# Patient Record
Sex: FEMALE | NOT HISPANIC OR LATINO
[De-identification: names, ages, dates, MRNs, and addresses within clinical notes are randomized per-mention and may not be internally consistent; named-entity substitution may affect disease eponyms.]

---

## 2017-02-22 ENCOUNTER — RX RENEWAL (OUTPATIENT)
Age: 82
End: 2017-02-22

## 2017-07-12 ENCOUNTER — RESULT REVIEW (OUTPATIENT)
Age: 82
End: 2017-07-12

## 2017-08-22 ENCOUNTER — APPOINTMENT (OUTPATIENT)
Dept: HEMATOLOGY ONCOLOGY | Facility: CLINIC | Age: 82
End: 2017-08-22
Payer: MEDICARE

## 2017-08-22 VITALS
DIASTOLIC BLOOD PRESSURE: 70 MMHG | BODY MASS INDEX: 32.26 KG/M2 | TEMPERATURE: 98.9 F | SYSTOLIC BLOOD PRESSURE: 122 MMHG | WEIGHT: 196 LBS | HEIGHT: 65.5 IN | HEART RATE: 87 BPM

## 2017-08-22 PROCEDURE — 99214 OFFICE O/P EST MOD 30 MIN: CPT

## 2017-12-22 ENCOUNTER — LABORATORY RESULT (OUTPATIENT)
Age: 82
End: 2017-12-22

## 2017-12-22 ENCOUNTER — APPOINTMENT (OUTPATIENT)
Dept: HEMATOLOGY ONCOLOGY | Facility: CLINIC | Age: 82
End: 2017-12-22
Payer: MEDICARE

## 2017-12-22 VITALS
HEART RATE: 85 BPM | SYSTOLIC BLOOD PRESSURE: 118 MMHG | WEIGHT: 192 LBS | DIASTOLIC BLOOD PRESSURE: 69 MMHG | TEMPERATURE: 98.5 F | BODY MASS INDEX: 31.6 KG/M2 | HEIGHT: 65.5 IN

## 2017-12-22 LAB
HCT VFR BLD CALC: 41.1 %
HGB BLD-MCNC: 13.8 G/DL
MCHC RBC-ENTMCNC: 33.6 GM/DL
MCHC RBC-ENTMCNC: 33.6 PG
MCV RBC AUTO: 100 FL
PLATELET # BLD AUTO: 199 K/UL
RBC # BLD: 4.1 M/UL
RBC # FLD: 11.7 %
WBC # FLD AUTO: 8 K/UL

## 2017-12-22 PROCEDURE — 36415 COLL VENOUS BLD VENIPUNCTURE: CPT

## 2017-12-22 PROCEDURE — 85025 COMPLETE CBC W/AUTO DIFF WBC: CPT

## 2017-12-22 PROCEDURE — 99215 OFFICE O/P EST HI 40 MIN: CPT | Mod: 25

## 2017-12-22 RX ORDER — PEN NEEDLE, DIABETIC 30 GX 1/3"
32G X 5 MM NEEDLE, DISPOSABLE MISCELLANEOUS
Qty: 100 | Refills: 0 | Status: ACTIVE | COMMUNITY
Start: 2017-07-05

## 2018-01-03 LAB
ERYTHROCYTE [SEDIMENTATION RATE] IN BLOOD BY WESTERGREN METHOD: 21 MM/HR
FERRITIN SERPL-MCNC: 509 NG/ML
HFE GENE MUT ANL BLD/T: NORMAL
IRON SATN MFR SERPL: 55 %
IRON SERPL-MCNC: 173 UG/DL
TIBC SERPL-MCNC: 312 UG/DL
UIBC SERPL-MCNC: 139 UG/DL

## 2018-02-23 ENCOUNTER — APPOINTMENT (OUTPATIENT)
Dept: HEMATOLOGY ONCOLOGY | Facility: CLINIC | Age: 83
End: 2018-02-23
Payer: MEDICARE

## 2018-02-23 VITALS
TEMPERATURE: 98 F | HEIGHT: 65.5 IN | DIASTOLIC BLOOD PRESSURE: 78 MMHG | BODY MASS INDEX: 31.27 KG/M2 | HEART RATE: 79 BPM | WEIGHT: 190 LBS | SYSTOLIC BLOOD PRESSURE: 146 MMHG

## 2018-02-23 PROCEDURE — 99214 OFFICE O/P EST MOD 30 MIN: CPT

## 2018-02-23 RX ORDER — METOPROLOL TARTRATE 25 MG/1
25 TABLET, FILM COATED ORAL DAILY
Refills: 0 | Status: ACTIVE | COMMUNITY
Start: 2018-02-23

## 2018-04-06 ENCOUNTER — LABORATORY RESULT (OUTPATIENT)
Age: 83
End: 2018-04-06

## 2018-04-06 ENCOUNTER — APPOINTMENT (OUTPATIENT)
Dept: INFUSION THERAPY | Facility: CLINIC | Age: 83
End: 2018-04-06
Payer: MEDICARE

## 2018-04-06 VITALS
HEART RATE: 76 BPM | TEMPERATURE: 98.4 F | DIASTOLIC BLOOD PRESSURE: 72 MMHG | BODY MASS INDEX: 31.44 KG/M2 | WEIGHT: 191 LBS | HEIGHT: 65.5 IN | SYSTOLIC BLOOD PRESSURE: 145 MMHG

## 2018-04-06 LAB
HCT VFR BLD CALC: 42 %
HGB BLD-MCNC: 13.6 G/DL
MCHC RBC-ENTMCNC: 32.3 PG
MCHC RBC-ENTMCNC: 32.4 GM/DL
MCV RBC AUTO: 99.7 FL
PLATELET # BLD AUTO: 217 K/UL
RBC # BLD: 4.21 M/UL
RBC # FLD: 11.8 %
WBC # FLD AUTO: 8 K/UL

## 2018-04-06 PROCEDURE — 99195 PHLEBOTOMY: CPT | Mod: 59

## 2018-04-06 PROCEDURE — 36415 COLL VENOUS BLD VENIPUNCTURE: CPT

## 2018-04-06 PROCEDURE — 85025 COMPLETE CBC W/AUTO DIFF WBC: CPT

## 2018-04-07 LAB
ERYTHROCYTE [SEDIMENTATION RATE] IN BLOOD BY WESTERGREN METHOD: 27 MM/HR
FERRITIN SERPL-MCNC: 407 NG/ML
IRON SATN MFR SERPL: 55 %
IRON SERPL-MCNC: 163 UG/DL
TIBC SERPL-MCNC: 298 UG/DL
UIBC SERPL-MCNC: 135 UG/DL

## 2018-05-18 ENCOUNTER — LABORATORY RESULT (OUTPATIENT)
Age: 83
End: 2018-05-18

## 2018-05-18 ENCOUNTER — APPOINTMENT (OUTPATIENT)
Dept: INFUSION THERAPY | Facility: CLINIC | Age: 83
End: 2018-05-18
Payer: MEDICARE

## 2018-05-18 VITALS
BODY MASS INDEX: 31.38 KG/M2 | DIASTOLIC BLOOD PRESSURE: 78 MMHG | HEART RATE: 76 BPM | WEIGHT: 191.5 LBS | TEMPERATURE: 98.2 F | SYSTOLIC BLOOD PRESSURE: 146 MMHG

## 2018-05-18 VITALS — HEART RATE: 80 BPM | SYSTOLIC BLOOD PRESSURE: 140 MMHG | DIASTOLIC BLOOD PRESSURE: 83 MMHG

## 2018-05-18 PROCEDURE — 99195 PHLEBOTOMY: CPT | Mod: 59

## 2018-05-18 PROCEDURE — 36415 COLL VENOUS BLD VENIPUNCTURE: CPT

## 2018-05-18 PROCEDURE — 85025 COMPLETE CBC W/AUTO DIFF WBC: CPT

## 2018-05-21 LAB
HCT VFR BLD CALC: 40.6 %
HGB BLD-MCNC: 13.5 G/DL
MCHC RBC-ENTMCNC: 32.9 PG
MCHC RBC-ENTMCNC: 33.2 GM/DL
MCV RBC AUTO: 99.1 FL
PLATELET # BLD AUTO: 213 K/UL
RBC # BLD: 4.1 M/UL
RBC # FLD: 12.2 %
WBC # FLD AUTO: 8.7 K/UL

## 2018-06-29 ENCOUNTER — LABORATORY RESULT (OUTPATIENT)
Age: 83
End: 2018-06-29

## 2018-06-29 ENCOUNTER — APPOINTMENT (OUTPATIENT)
Dept: INFUSION THERAPY | Facility: CLINIC | Age: 83
End: 2018-06-29
Payer: MEDICARE

## 2018-06-29 VITALS
DIASTOLIC BLOOD PRESSURE: 68 MMHG | TEMPERATURE: 98.5 F | HEART RATE: 76 BPM | BODY MASS INDEX: 31.77 KG/M2 | WEIGHT: 193 LBS | SYSTOLIC BLOOD PRESSURE: 116 MMHG | HEIGHT: 65.5 IN

## 2018-06-29 LAB
HCT VFR BLD CALC: 40.9 %
HGB BLD-MCNC: 13.7 G/DL
MCHC RBC-ENTMCNC: 33.2 PG
MCHC RBC-ENTMCNC: 33.6 GM/DL
MCV RBC AUTO: 98.8 FL
PLATELET # BLD AUTO: 202 K/UL
RBC # BLD: 4.14 M/UL
RBC # FLD: 11.4 %
WBC # FLD AUTO: 6.5 K/UL

## 2018-06-29 PROCEDURE — 85025 COMPLETE CBC W/AUTO DIFF WBC: CPT

## 2018-06-29 PROCEDURE — 36415 COLL VENOUS BLD VENIPUNCTURE: CPT

## 2018-06-29 PROCEDURE — 99195 PHLEBOTOMY: CPT | Mod: 59

## 2018-08-02 ENCOUNTER — OTHER (OUTPATIENT)
Age: 83
End: 2018-08-02

## 2018-08-10 ENCOUNTER — LABORATORY RESULT (OUTPATIENT)
Age: 83
End: 2018-08-10

## 2018-08-10 ENCOUNTER — APPOINTMENT (OUTPATIENT)
Dept: INFUSION THERAPY | Facility: CLINIC | Age: 83
End: 2018-08-10
Payer: MEDICARE

## 2018-08-10 VITALS
BODY MASS INDEX: 31.77 KG/M2 | SYSTOLIC BLOOD PRESSURE: 146 MMHG | HEIGHT: 65.5 IN | HEART RATE: 74 BPM | DIASTOLIC BLOOD PRESSURE: 67 MMHG | WEIGHT: 193 LBS | TEMPERATURE: 98.6 F

## 2018-08-10 LAB
HCT VFR BLD CALC: 42.1 %
HGB BLD-MCNC: 14.3 G/DL
MCHC RBC-ENTMCNC: 33.2 PG
MCHC RBC-ENTMCNC: 33.9 GM/DL
MCV RBC AUTO: 97.8 FL
PLATELET # BLD AUTO: 186 K/UL
RBC # BLD: 4.3 M/UL
RBC # FLD: 11 %
WBC # FLD AUTO: 7.1 K/UL

## 2018-08-10 PROCEDURE — 99195 PHLEBOTOMY: CPT | Mod: 59

## 2018-08-10 PROCEDURE — 85025 COMPLETE CBC W/AUTO DIFF WBC: CPT

## 2018-08-10 PROCEDURE — 36415 COLL VENOUS BLD VENIPUNCTURE: CPT

## 2018-08-14 LAB
ERYTHROCYTE [SEDIMENTATION RATE] IN BLOOD BY WESTERGREN METHOD: 16 MM/HR
FERRITIN SERPL-MCNC: 251 NG/ML
IRON SATN MFR SERPL: 33 %
IRON SERPL-MCNC: 111 UG/DL
TIBC SERPL-MCNC: 334 UG/DL
UIBC SERPL-MCNC: 223 UG/DL

## 2018-09-21 ENCOUNTER — APPOINTMENT (OUTPATIENT)
Dept: INFUSION THERAPY | Facility: CLINIC | Age: 83
End: 2018-09-21
Payer: MEDICARE

## 2018-09-21 ENCOUNTER — LABORATORY RESULT (OUTPATIENT)
Age: 83
End: 2018-09-21

## 2018-09-21 VITALS
TEMPERATURE: 98.5 F | HEIGHT: 65.5 IN | DIASTOLIC BLOOD PRESSURE: 76 MMHG | SYSTOLIC BLOOD PRESSURE: 139 MMHG | HEART RATE: 78 BPM

## 2018-09-21 LAB
HCT VFR BLD CALC: 40.2 %
HGB BLD-MCNC: 13.8 G/DL
MCHC RBC-ENTMCNC: 32.8 PG
MCHC RBC-ENTMCNC: 34.4 GM/DL
MCV RBC AUTO: 95.5 FL
PLATELET # BLD AUTO: 183 K/UL
RBC # BLD: 4.21 M/UL
RBC # FLD: 11.4 %
WBC # FLD AUTO: 7 K/UL

## 2018-09-21 PROCEDURE — 36415 COLL VENOUS BLD VENIPUNCTURE: CPT

## 2018-09-21 PROCEDURE — 99195 PHLEBOTOMY: CPT | Mod: 59

## 2018-09-21 PROCEDURE — 85025 COMPLETE CBC W/AUTO DIFF WBC: CPT

## 2018-11-02 ENCOUNTER — APPOINTMENT (OUTPATIENT)
Dept: INFUSION THERAPY | Facility: CLINIC | Age: 83
End: 2018-11-02

## 2018-11-16 ENCOUNTER — LABORATORY RESULT (OUTPATIENT)
Age: 83
End: 2018-11-16

## 2018-11-16 ENCOUNTER — APPOINTMENT (OUTPATIENT)
Dept: HEMATOLOGY ONCOLOGY | Facility: CLINIC | Age: 83
End: 2018-11-16
Payer: MEDICARE

## 2018-11-16 VITALS
SYSTOLIC BLOOD PRESSURE: 124 MMHG | HEIGHT: 65.5 IN | BODY MASS INDEX: 31.77 KG/M2 | HEART RATE: 78 BPM | TEMPERATURE: 98.3 F | DIASTOLIC BLOOD PRESSURE: 75 MMHG | WEIGHT: 193 LBS

## 2018-11-16 LAB
HCT VFR BLD CALC: 42.1 %
HGB BLD-MCNC: 13.5 G/DL
MCHC RBC-ENTMCNC: 32.1 GM/DL
MCHC RBC-ENTMCNC: 32.4 PG
MCV RBC AUTO: 101 FL
PLATELET # BLD AUTO: 201 K/UL
RBC # BLD: 4.17 M/UL
RBC # FLD: 12 %
WBC # FLD AUTO: 7.3 K/UL

## 2018-11-16 PROCEDURE — 36415 COLL VENOUS BLD VENIPUNCTURE: CPT

## 2018-11-16 PROCEDURE — 85025 COMPLETE CBC W/AUTO DIFF WBC: CPT

## 2018-11-17 LAB
IRON SATN MFR SERPL: 38 %
IRON SERPL-MCNC: 130 UG/DL
TIBC SERPL-MCNC: 338 UG/DL
UIBC SERPL-MCNC: 208 UG/DL

## 2018-11-20 LAB — FERRITIN SERPL-MCNC: 192 NG/ML

## 2018-11-30 ENCOUNTER — APPOINTMENT (OUTPATIENT)
Dept: INFUSION THERAPY | Facility: CLINIC | Age: 83
End: 2018-11-30
Payer: MEDICARE

## 2018-11-30 ENCOUNTER — APPOINTMENT (OUTPATIENT)
Dept: HEMATOLOGY ONCOLOGY | Facility: CLINIC | Age: 83
End: 2018-11-30
Payer: MEDICARE

## 2018-11-30 ENCOUNTER — LABORATORY RESULT (OUTPATIENT)
Age: 83
End: 2018-11-30

## 2018-11-30 VITALS
TEMPERATURE: 98 F | DIASTOLIC BLOOD PRESSURE: 69 MMHG | HEART RATE: 73 BPM | HEIGHT: 65.5 IN | WEIGHT: 196.13 LBS | SYSTOLIC BLOOD PRESSURE: 139 MMHG | BODY MASS INDEX: 32.28 KG/M2

## 2018-11-30 PROCEDURE — 99195 PHLEBOTOMY: CPT | Mod: 59

## 2018-11-30 PROCEDURE — 99214 OFFICE O/P EST MOD 30 MIN: CPT | Mod: 25

## 2018-11-30 PROCEDURE — 85025 COMPLETE CBC W/AUTO DIFF WBC: CPT

## 2018-11-30 PROCEDURE — 36415 COLL VENOUS BLD VENIPUNCTURE: CPT

## 2018-11-30 NOTE — ASSESSMENT
[FreeTextEntry1] : 82 y/o woman with history of stage IA left breast cancer diagnosed in 2007, s/p lumpectomy, adjuvant radiation and 10 years of adjuvant Arimidex. SHERIDAN. Yearly exam.  Mammogram yearly- scheduled. 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 \par \par In view of her age- ferritin goal will be less" aggressive"- < 100 mg%.\par \par Phlebotomy today and one more later this month.\par Return visit in March for phlebotomy/ labs will be done at that time.\par Testing of family members was discussed previously. \par \par \par

## 2018-11-30 NOTE — REASON FOR VISIT
[Follow-Up Visit] : a follow-up [FreeTextEntry2] : Hereditary hemochromatosis  and history of breast cancer

## 2018-12-03 LAB
HCT VFR BLD CALC: 44.1 %
HGB BLD-MCNC: 14 G/DL
MCHC RBC-ENTMCNC: 31.7 GM/DL
MCHC RBC-ENTMCNC: 31.7 PG
MCV RBC AUTO: 99.7 FL
PLATELET # BLD AUTO: 202 K/UL
RBC # BLD: 4.42 M/UL
RBC # FLD: 12 %
WBC # FLD AUTO: 6 K/UL

## 2018-12-18 ENCOUNTER — APPOINTMENT (OUTPATIENT)
Dept: INFUSION THERAPY | Facility: CLINIC | Age: 83
End: 2018-12-18
Payer: MEDICARE

## 2018-12-18 ENCOUNTER — LABORATORY RESULT (OUTPATIENT)
Age: 83
End: 2018-12-18

## 2018-12-18 VITALS
HEART RATE: 76 BPM | SYSTOLIC BLOOD PRESSURE: 161 MMHG | BODY MASS INDEX: 32.8 KG/M2 | HEIGHT: 65.5 IN | DIASTOLIC BLOOD PRESSURE: 75 MMHG | TEMPERATURE: 98.3 F | WEIGHT: 199.25 LBS

## 2018-12-18 LAB
HCT VFR BLD CALC: 41.7 %
HGB BLD-MCNC: 13.5 G/DL
MCHC RBC-ENTMCNC: 32.3 GM/DL
MCHC RBC-ENTMCNC: 32.5 PG
MCV RBC AUTO: 101 FL
PLATELET # BLD AUTO: 218 K/UL
RBC # BLD: 4.15 M/UL
RBC # FLD: 12.5 %
WBC # FLD AUTO: 8.3 K/UL

## 2018-12-18 PROCEDURE — 99195 PHLEBOTOMY: CPT | Mod: 59

## 2018-12-18 PROCEDURE — 85025 COMPLETE CBC W/AUTO DIFF WBC: CPT

## 2018-12-18 PROCEDURE — 36415 COLL VENOUS BLD VENIPUNCTURE: CPT

## 2019-03-04 ENCOUNTER — TRANSCRIPTION ENCOUNTER (OUTPATIENT)
Age: 84
End: 2019-03-04

## 2019-03-12 ENCOUNTER — APPOINTMENT (OUTPATIENT)
Dept: INFUSION THERAPY | Facility: CLINIC | Age: 84
End: 2019-03-12

## 2019-03-12 ENCOUNTER — LABORATORY RESULT (OUTPATIENT)
Age: 84
End: 2019-03-12

## 2019-03-12 ENCOUNTER — APPOINTMENT (OUTPATIENT)
Dept: HEMATOLOGY ONCOLOGY | Facility: CLINIC | Age: 84
End: 2019-03-12
Payer: MEDICARE

## 2019-03-12 VITALS
WEIGHT: 194.13 LBS | BODY MASS INDEX: 31.96 KG/M2 | HEART RATE: 73 BPM | SYSTOLIC BLOOD PRESSURE: 143 MMHG | DIASTOLIC BLOOD PRESSURE: 79 MMHG | HEIGHT: 65.5 IN | TEMPERATURE: 98.3 F

## 2019-03-12 DIAGNOSIS — Z79.899 ENCOUNTER FOR THERAPEUTIC DRUG LVL MONITORING: ICD-10-CM

## 2019-03-12 DIAGNOSIS — E11.9 TYPE 2 DIABETES MELLITUS W/OUT COMPLICATIONS: ICD-10-CM

## 2019-03-12 DIAGNOSIS — Z51.81 ENCOUNTER FOR THERAPEUTIC DRUG LVL MONITORING: ICD-10-CM

## 2019-03-12 DIAGNOSIS — Z85.3 ENCOUNTER FOR FOLLOW-UP EXAMINATION AFTER COMPLETED TREATMENT FOR MALIGNANT NEOPLASM: ICD-10-CM

## 2019-03-12 DIAGNOSIS — Z08 ENCOUNTER FOR FOLLOW-UP EXAMINATION AFTER COMPLETED TREATMENT FOR MALIGNANT NEOPLASM: ICD-10-CM

## 2019-03-12 LAB
HCT VFR BLD CALC: 45 %
HGB BLD-MCNC: 15 G/DL
MCHC RBC-ENTMCNC: 32.2 PG
MCHC RBC-ENTMCNC: 33.4 GM/DL
MCV RBC AUTO: 96.4 FL
PLATELET # BLD AUTO: 190 K/UL
RBC # BLD: 4.67 M/UL
RBC # FLD: 11.4 %
WBC # FLD AUTO: 5.9 K/UL

## 2019-03-12 PROCEDURE — 36415 COLL VENOUS BLD VENIPUNCTURE: CPT

## 2019-03-12 PROCEDURE — 99213 OFFICE O/P EST LOW 20 MIN: CPT | Mod: 25

## 2019-03-12 PROCEDURE — 99195 PHLEBOTOMY: CPT | Mod: 59

## 2019-03-12 PROCEDURE — 85025 COMPLETE CBC W/AUTO DIFF WBC: CPT

## 2019-03-12 NOTE — RESULTS/DATA
[FreeTextEntry1] : WBC: 5.9   Hgb: 15.00  Hct: 45.0  MCV: 96.4  Plts: 190\par Ferritin, TSAT: pending\par \par Labs done 2/20/19 (Dr. Bills's office)\par Ferritin was 109, TSAT 41%.  \par \par Labs done 11/16/18:\par Ferritin was 192, TSAT 38%.  - had phleb x 2 after\par

## 2019-03-12 NOTE — HISTORY OF PRESENT ILLNESS
[de-identified] : RICHI WHITE is an 83 y.o. who we are following for a hx of a left sided ER+, HER2- stage I BC and hemochromatosis. \par Hx of a stage IA left BC in 2007 - s/p left lumpectomy on 11/14/07 for a 1.4cm ductal carcinoma with lobular features, 1 SLN was neg, T1cN0, ER>80%, AK> 80%, HER2 neg by FISH.  Had XRT, followed by 10 years of hormonal therapy completed on 2/2018 (initially with Femara, then changed to Arimidex due to cost issues). \par \par 10/2017 - Routine labs by PCP showed a Hgb of 14.3 with a Ferritin of 537 and a TSAT of 53%.  She was taking a MVI with iron and stopped this.  Genetic testing showed double heterozygous mutation for C282Y and H63D in the HFE gene.  Started on phlebotomies, initially every 2 months. \par 2/20/19 - Ferritin was 109, TSAT 41%.   [de-identified] : The patient had labs done with Dr. Bills on 2/20/19 - Ferritin was 109, TSAT 41%.  Phlebotomized today.  \par Feeling well.  Has no complaints.  She denies any changes in her family, medical, or social history since her last visit of 11/30/18.

## 2019-03-12 NOTE — PHYSICAL EXAM
[Obese] : obese [Normal] : affect appropriate [de-identified] : elderly female, looks good for age [de-identified] : anicteric [de-identified] : no thrush or mucositis [de-identified] : no lymphadenopathy [de-identified] : S1S2 RRR, no obvious murmurs [de-identified] : trace- 1+ LE edema, L>R [de-identified] : patient declined [de-identified] : no rashes

## 2019-03-12 NOTE — ASSESSMENT
[FreeTextEntry1] : The patient is an 83 y.o. with a PMH significant for an ER+ left sided stage I BC, s/p lumpectomy, XRT, and AI x 10 years, last 2/2018, now being treated for hemochromatosis.  \par Last Ferritin was 109.  Review of last shows that she seems to decrease ~ 45 - 50 points with each phlebotomy. Based on this I would expect her Ferritin would now be down to ~ 60. \par This is the 1st time we have gotten her low since her diagnosis - I do not know how long it will take her to reaccumulate.  \par Will have her return in 4 months for iron studies.  Will schedule her for a phleb once Ferritin >~ 120.  \par Continue routine follow-up: \par Getting yearly mammograms - Was due 11/2018 - still needs to get.  Has had to cancel some appts due to weather and work. \par Bone density - Due 11/2019. Last with moderate- severe osteopenia.  \par Doesn't go to GYN anymore, also no more colonoscopies.  \par Return in 4 months for labs. \par \par Dr. Geovany Bills (PMD)\par Dr. Michael Murray (Endo)\par Dr. Torsten Mendoza (Cardio)\par Dr. Umanzor (GI)\par

## 2019-03-12 NOTE — REVIEW OF SYSTEMS
[Patient Intake Form Reviewed] : Patient intake form was reviewed [Loss of Hearing] : loss of hearing [Negative] : Allergic/Immunologic [FreeTextEntry8] : frequency

## 2019-03-13 LAB
FERRITIN SERPL-MCNC: 136 NG/ML
IRON SATN MFR SERPL: 32 %
IRON SERPL-MCNC: 121 UG/DL
TIBC SERPL-MCNC: 373 UG/DL
UIBC SERPL-MCNC: 252 UG/DL

## 2019-07-16 ENCOUNTER — APPOINTMENT (OUTPATIENT)
Dept: HEMATOLOGY ONCOLOGY | Facility: CLINIC | Age: 84
End: 2019-07-16
Payer: MEDICARE

## 2019-07-16 ENCOUNTER — LABORATORY RESULT (OUTPATIENT)
Age: 84
End: 2019-07-16

## 2019-07-16 VITALS — TEMPERATURE: 98.5 F | HEART RATE: 71 BPM | DIASTOLIC BLOOD PRESSURE: 78 MMHG | SYSTOLIC BLOOD PRESSURE: 143 MMHG

## 2019-07-16 LAB
FERRITIN SERPL-MCNC: 112 NG/ML
HCT VFR BLD CALC: 43.4 %
HGB BLD-MCNC: 14.3 G/DL
IRON SATN MFR SERPL: 43 %
IRON SERPL-MCNC: 147 UG/DL
MCHC RBC-ENTMCNC: 32.4 PG
MCHC RBC-ENTMCNC: 32.9 GM/DL
MCV RBC AUTO: 98.5 FL
PLATELET # BLD AUTO: 197 K/UL
RBC # BLD: 4.41 M/UL
RBC # FLD: 11.9 %
TIBC SERPL-MCNC: 343 UG/DL
UIBC SERPL-MCNC: 196 UG/DL
WBC # FLD AUTO: 5.9 K/UL

## 2019-07-16 PROCEDURE — 85025 COMPLETE CBC W/AUTO DIFF WBC: CPT

## 2019-07-16 PROCEDURE — 36415 COLL VENOUS BLD VENIPUNCTURE: CPT

## 2020-10-23 ENCOUNTER — OUTPATIENT (OUTPATIENT)
Dept: OUTPATIENT SERVICES | Facility: HOSPITAL | Age: 85
LOS: 1 days | Discharge: HOME | End: 2020-10-23

## 2020-10-23 DIAGNOSIS — E83.110 HEREDITARY HEMOCHROMATOSIS: ICD-10-CM

## 2020-10-23 DIAGNOSIS — C50.912 MALIGNANT NEOPLASM OF UNSPECIFIED SITE OF LEFT FEMALE BREAST: ICD-10-CM

## 2020-10-26 ENCOUNTER — APPOINTMENT (OUTPATIENT)
Dept: HEMATOLOGY ONCOLOGY | Facility: CLINIC | Age: 85
End: 2020-10-26
Payer: MEDICARE

## 2020-10-26 VITALS
RESPIRATION RATE: 16 BRPM | DIASTOLIC BLOOD PRESSURE: 76 MMHG | TEMPERATURE: 97.9 F | SYSTOLIC BLOOD PRESSURE: 131 MMHG | WEIGHT: 195 LBS | HEIGHT: 65.5 IN | BODY MASS INDEX: 32.1 KG/M2 | HEART RATE: 75 BPM

## 2020-10-26 DIAGNOSIS — C50.912 MALIGNANT NEOPLASM OF UNSPECIFIED SITE OF LEFT FEMALE BREAST: ICD-10-CM

## 2020-10-26 PROCEDURE — 99214 OFFICE O/P EST MOD 30 MIN: CPT

## 2020-10-26 RX ORDER — LISINOPRIL 5 MG/1
5 TABLET ORAL DAILY
Qty: 90 | Refills: 1 | Status: DISCONTINUED | COMMUNITY
Start: 2018-11-30 | End: 2020-10-26

## 2020-10-26 RX ORDER — AMLODIPINE BESYLATE 5 MG/1
5 TABLET ORAL
Qty: 90 | Refills: 0 | Status: ACTIVE | COMMUNITY
Start: 2020-03-25

## 2020-10-26 RX ORDER — LISINOPRIL 10 MG/1
10 TABLET ORAL
Qty: 90 | Refills: 0 | Status: ACTIVE | COMMUNITY
Start: 2020-10-09

## 2020-10-26 RX ORDER — VITAMIN K2 90 MCG
125 MCG CAPSULE ORAL
Qty: 90 | Refills: 0 | Status: ACTIVE | COMMUNITY
Start: 2020-07-10

## 2020-10-26 RX ORDER — PANTOPRAZOLE 40 MG/1
40 TABLET, DELAYED RELEASE ORAL
Qty: 30 | Refills: 0 | Status: DISCONTINUED | COMMUNITY
Start: 2017-08-31 | End: 2020-10-26

## 2020-10-26 NOTE — RESULTS/DATA
[FreeTextEntry1] : reviewed recent labs from PCP ( done Oct 2020) \par \par ferritin 193  sat 53 %  Hgb normal

## 2020-10-26 NOTE — ASSESSMENT
[FreeTextEntry1] : 84 y/o woman with history of stage IA left breast cancer diagnosed in 2007, s/p lumpectomy, adjuvant radiation and 10 years of adjuvant Arimidex. SHERIDAN. \par \par Continue yearly exam and yearly mammogram. 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 \par \par In view of her age- ferritin goal will be less" aggressive"- < 100 mg%.\par \par Plan : phlebotomy x 2 - 3 months apart.\par \par Blood work : CBC, iron studies in 6 months.\par \par D/w patient and her daughter.

## 2020-10-26 NOTE — HISTORY OF PRESENT ILLNESS
[Disease: _____________________] : Disease: [unfilled] [T: ___] : T[unfilled] [N: ___] : N[unfilled] [M: ___] : M[unfilled] [AJCC Stage: ____] : AJCC Stage: [unfilled] [de-identified] : Presented in 2007 with an abnormal mammogram. On 11/14/2007 she underwent left breast lumpectomy and sentinel lymph node biopsy for stage I A ductal/ lobular cancer , strongly ER/LA positive. Received adjuvant radiation and started adjuvant aromatase inhibitor in February 2008 ( Femara, changed to Arimidex due to cost issues).She opted for extended adjuvant hormonal therapy. Completed 10 years in February 2018.\par \par Routine blood work by PCP in  October 2017 showed normal Hgb 14.3 with ferritin 537 and iron sat 53 %). she stopped multivitamin with iron. No history of hemochromatosis in the family. Genetic testing showed double heterozygous mutation for C282Y and H63D.\par She has been getting phlebotomies  here. She was not interested in going to Blood center for phlebotomies. \par \par Last phlebotomy over one year ago.  [de-identified] : ductal with focal lobular features  [de-identified] : ER > 80 %  OK > 80 %  HER 2 negative  by FISH [de-identified] : Feels well. Has no complaints. Last phlebotomy > one year ago.\par mammogram- within one year _ [facility] ( told OK, gets Rx from PCP).

## 2020-10-26 NOTE — PHYSICAL EXAM
[Normal] : well developed, well nourished, in no acute distress [Fully active, able to carry on all pre-disease performance without restriction] : Status 0 - Fully active, able to carry on all pre-disease performance without restriction [de-identified] : Faint lumpectomy scar L berast and axillary scar. no palpable masses b/l No axillary adenopathy b/l

## 2020-11-04 ENCOUNTER — RESULT REVIEW (OUTPATIENT)
Age: 85
End: 2020-11-04

## 2020-11-04 ENCOUNTER — APPOINTMENT (OUTPATIENT)
Dept: INFUSION THERAPY | Facility: CLINIC | Age: 85
End: 2020-11-04

## 2020-11-04 VITALS
HEIGHT: 65.5 IN | SYSTOLIC BLOOD PRESSURE: 122 MMHG | DIASTOLIC BLOOD PRESSURE: 71 MMHG | RESPIRATION RATE: 18 BRPM | WEIGHT: 195 LBS | HEART RATE: 93 BPM | TEMPERATURE: 97.7 F | BODY MASS INDEX: 32.1 KG/M2

## 2020-11-04 LAB
BASOPHILS # BLD AUTO: 0.04 K/UL — SIGNIFICANT CHANGE UP (ref 0–0.2)
BASOPHILS NFR BLD AUTO: 0.6 % — SIGNIFICANT CHANGE UP (ref 0–2)
EOSINOPHIL # BLD AUTO: 0.16 K/UL — SIGNIFICANT CHANGE UP (ref 0–0.5)
EOSINOPHIL NFR BLD AUTO: 2.3 % — SIGNIFICANT CHANGE UP (ref 0–6)
HCT VFR BLD CALC: 41 % — SIGNIFICANT CHANGE UP (ref 34.5–45)
HGB BLD-MCNC: 13.9 G/DL — SIGNIFICANT CHANGE UP (ref 11.5–15.5)
IMM GRANULOCYTES NFR BLD AUTO: 0.1 % — SIGNIFICANT CHANGE UP (ref 0–1.5)
LYMPHOCYTES # BLD AUTO: 2.01 K/UL — SIGNIFICANT CHANGE UP (ref 1–3.3)
LYMPHOCYTES # BLD AUTO: 28.9 % — SIGNIFICANT CHANGE UP (ref 13–44)
MCHC RBC-ENTMCNC: 33.2 PG — SIGNIFICANT CHANGE UP (ref 27–34)
MCHC RBC-ENTMCNC: 33.9 GM/DL — SIGNIFICANT CHANGE UP (ref 32–36)
MCV RBC AUTO: 97.9 FL — SIGNIFICANT CHANGE UP (ref 80–100)
MONOCYTES # BLD AUTO: 0.71 K/UL — SIGNIFICANT CHANGE UP (ref 0–0.9)
MONOCYTES NFR BLD AUTO: 10.2 % — SIGNIFICANT CHANGE UP (ref 2–14)
NEUTROPHILS # BLD AUTO: 4.03 K/UL — SIGNIFICANT CHANGE UP (ref 1.8–7.4)
NEUTROPHILS NFR BLD AUTO: 57.9 % — SIGNIFICANT CHANGE UP (ref 43–77)
NRBC # BLD: 0 /100 WBCS — SIGNIFICANT CHANGE UP (ref 0–0)
PLATELET # BLD AUTO: 192 K/UL — SIGNIFICANT CHANGE UP (ref 150–400)
RBC # BLD: 4.19 M/UL — SIGNIFICANT CHANGE UP (ref 3.8–5.2)
RBC # FLD: 12 % — SIGNIFICANT CHANGE UP (ref 10.3–14.5)
WBC # BLD: 6.96 K/UL — SIGNIFICANT CHANGE UP (ref 3.8–10.5)
WBC # FLD AUTO: 6.96 K/UL — SIGNIFICANT CHANGE UP (ref 3.8–10.5)

## 2021-01-20 ENCOUNTER — OUTPATIENT (OUTPATIENT)
Dept: OUTPATIENT SERVICES | Facility: HOSPITAL | Age: 86
LOS: 1 days | Discharge: HOME | End: 2021-01-20

## 2021-01-20 DIAGNOSIS — C50.912 MALIGNANT NEOPLASM OF UNSPECIFIED SITE OF LEFT FEMALE BREAST: ICD-10-CM

## 2021-01-20 DIAGNOSIS — E83.110 HEREDITARY HEMOCHROMATOSIS: ICD-10-CM

## 2021-01-27 ENCOUNTER — LABORATORY RESULT (OUTPATIENT)
Age: 86
End: 2021-01-27

## 2021-01-27 ENCOUNTER — RESULT REVIEW (OUTPATIENT)
Age: 86
End: 2021-01-27

## 2021-01-27 ENCOUNTER — APPOINTMENT (OUTPATIENT)
Dept: HEMATOLOGY ONCOLOGY | Facility: CLINIC | Age: 86
End: 2021-01-27

## 2021-01-27 LAB
BASOPHILS # BLD AUTO: 0.04 K/UL — SIGNIFICANT CHANGE UP (ref 0–0.2)
BASOPHILS NFR BLD AUTO: 0.5 % — SIGNIFICANT CHANGE UP (ref 0–2)
EOSINOPHIL # BLD AUTO: 0.17 K/UL — SIGNIFICANT CHANGE UP (ref 0–0.5)
EOSINOPHIL NFR BLD AUTO: 2.2 % — SIGNIFICANT CHANGE UP (ref 0–6)
HCT VFR BLD CALC: 41.4 % — SIGNIFICANT CHANGE UP (ref 34.5–45)
HGB BLD-MCNC: 13.7 G/DL — SIGNIFICANT CHANGE UP (ref 11.5–15.5)
IMM GRANULOCYTES NFR BLD AUTO: 0.4 % — SIGNIFICANT CHANGE UP (ref 0–1.5)
LYMPHOCYTES # BLD AUTO: 2.13 K/UL — SIGNIFICANT CHANGE UP (ref 1–3.3)
LYMPHOCYTES # BLD AUTO: 27.2 % — SIGNIFICANT CHANGE UP (ref 13–44)
MCHC RBC-ENTMCNC: 32.5 PG — SIGNIFICANT CHANGE UP (ref 27–34)
MCHC RBC-ENTMCNC: 33.1 GM/DL — SIGNIFICANT CHANGE UP (ref 32–36)
MCV RBC AUTO: 98.1 FL — SIGNIFICANT CHANGE UP (ref 80–100)
MONOCYTES # BLD AUTO: 0.81 K/UL — SIGNIFICANT CHANGE UP (ref 0–0.9)
MONOCYTES NFR BLD AUTO: 10.4 % — SIGNIFICANT CHANGE UP (ref 2–14)
NEUTROPHILS # BLD AUTO: 4.64 K/UL — SIGNIFICANT CHANGE UP (ref 1.8–7.4)
NEUTROPHILS NFR BLD AUTO: 59.3 % — SIGNIFICANT CHANGE UP (ref 43–77)
NRBC # BLD: 0 /100 WBCS — SIGNIFICANT CHANGE UP (ref 0–0)
PLATELET # BLD AUTO: 233 K/UL — SIGNIFICANT CHANGE UP (ref 150–400)
RBC # BLD: 4.22 M/UL — SIGNIFICANT CHANGE UP (ref 3.8–5.2)
RBC # FLD: 11.9 % — SIGNIFICANT CHANGE UP (ref 10.3–14.5)
WBC # BLD: 7.82 K/UL — SIGNIFICANT CHANGE UP (ref 3.8–10.5)
WBC # FLD AUTO: 7.82 K/UL — SIGNIFICANT CHANGE UP (ref 3.8–10.5)

## 2021-02-03 ENCOUNTER — APPOINTMENT (OUTPATIENT)
Dept: INFUSION THERAPY | Facility: CLINIC | Age: 86
End: 2021-02-03

## 2021-02-03 VITALS
TEMPERATURE: 97.7 F | BODY MASS INDEX: 31.77 KG/M2 | RESPIRATION RATE: 16 BRPM | HEART RATE: 79 BPM | DIASTOLIC BLOOD PRESSURE: 79 MMHG | HEIGHT: 65.5 IN | SYSTOLIC BLOOD PRESSURE: 139 MMHG | WEIGHT: 193 LBS

## 2021-04-27 ENCOUNTER — OUTPATIENT (OUTPATIENT)
Dept: OUTPATIENT SERVICES | Facility: HOSPITAL | Age: 86
LOS: 1 days | Discharge: HOME | End: 2021-04-27

## 2021-04-27 DIAGNOSIS — C50.912 MALIGNANT NEOPLASM OF UNSPECIFIED SITE OF LEFT FEMALE BREAST: ICD-10-CM

## 2021-04-28 ENCOUNTER — RESULT REVIEW (OUTPATIENT)
Age: 86
End: 2021-04-28

## 2021-04-28 ENCOUNTER — APPOINTMENT (OUTPATIENT)
Dept: HEMATOLOGY ONCOLOGY | Facility: CLINIC | Age: 86
End: 2021-04-28

## 2021-04-28 LAB
BASOPHILS # BLD AUTO: 0.04 K/UL — SIGNIFICANT CHANGE UP (ref 0–0.2)
BASOPHILS NFR BLD AUTO: 0.6 % — SIGNIFICANT CHANGE UP (ref 0–2)
EOSINOPHIL # BLD AUTO: 0.23 K/UL — SIGNIFICANT CHANGE UP (ref 0–0.5)
EOSINOPHIL NFR BLD AUTO: 3.6 % — SIGNIFICANT CHANGE UP (ref 0–6)
FERRITIN SERPL-MCNC: 119 NG/ML — SIGNIFICANT CHANGE UP (ref 15–150)
HCT VFR BLD CALC: 42.1 % — SIGNIFICANT CHANGE UP (ref 34.5–45)
HGB BLD-MCNC: 13.8 G/DL — SIGNIFICANT CHANGE UP (ref 11.5–15.5)
IMM GRANULOCYTES NFR BLD AUTO: 0.5 % — SIGNIFICANT CHANGE UP (ref 0–1.5)
IRON SATN MFR SERPL: 132 UG/DL — SIGNIFICANT CHANGE UP (ref 30–160)
IRON SATN MFR SERPL: 38 % — SIGNIFICANT CHANGE UP (ref 14–50)
LYMPHOCYTES # BLD AUTO: 2.1 K/UL — SIGNIFICANT CHANGE UP (ref 1–3.3)
LYMPHOCYTES # BLD AUTO: 32.8 % — SIGNIFICANT CHANGE UP (ref 13–44)
MCHC RBC-ENTMCNC: 32.6 PG — SIGNIFICANT CHANGE UP (ref 27–34)
MCHC RBC-ENTMCNC: 32.8 GM/DL — SIGNIFICANT CHANGE UP (ref 32–36)
MCV RBC AUTO: 99.5 FL — SIGNIFICANT CHANGE UP (ref 80–100)
MONOCYTES # BLD AUTO: 0.8 K/UL — SIGNIFICANT CHANGE UP (ref 0–0.9)
MONOCYTES NFR BLD AUTO: 12.5 % — SIGNIFICANT CHANGE UP (ref 2–14)
NEUTROPHILS # BLD AUTO: 3.2 K/UL — SIGNIFICANT CHANGE UP (ref 1.8–7.4)
NEUTROPHILS NFR BLD AUTO: 50 % — SIGNIFICANT CHANGE UP (ref 43–77)
NRBC # BLD: 0 /100 WBCS — SIGNIFICANT CHANGE UP (ref 0–0)
PLATELET # BLD AUTO: 204 K/UL — SIGNIFICANT CHANGE UP (ref 150–400)
RBC # BLD: 4.23 M/UL — SIGNIFICANT CHANGE UP (ref 3.8–5.2)
RBC # FLD: 12.8 % — SIGNIFICANT CHANGE UP (ref 10.3–14.5)
TIBC SERPL-MCNC: 345 UG/DL — SIGNIFICANT CHANGE UP (ref 220–430)
UIBC SERPL-MCNC: 213 UG/DL — SIGNIFICANT CHANGE UP (ref 110–370)
WBC # BLD: 6.4 K/UL — SIGNIFICANT CHANGE UP (ref 3.8–10.5)
WBC # FLD AUTO: 6.4 K/UL — SIGNIFICANT CHANGE UP (ref 3.8–10.5)

## 2023-12-26 ENCOUNTER — EMERGENCY (EMERGENCY)
Facility: HOSPITAL | Age: 88
LOS: 0 days | Discharge: HOME | End: 2023-12-26
Attending: STUDENT IN AN ORGANIZED HEALTH CARE EDUCATION/TRAINING PROGRAM
Payer: MEDICARE

## 2023-12-26 VITALS
SYSTOLIC BLOOD PRESSURE: 126 MMHG | RESPIRATION RATE: 18 BRPM | TEMPERATURE: 98 F | DIASTOLIC BLOOD PRESSURE: 58 MMHG | HEART RATE: 104 BPM | OXYGEN SATURATION: 98 %

## 2023-12-26 VITALS
RESPIRATION RATE: 18 BRPM | SYSTOLIC BLOOD PRESSURE: 131 MMHG | OXYGEN SATURATION: 93 % | TEMPERATURE: 98 F | DIASTOLIC BLOOD PRESSURE: 73 MMHG | HEART RATE: 106 BPM

## 2023-12-26 DIAGNOSIS — Z91.040 LATEX ALLERGY STATUS: ICD-10-CM

## 2023-12-26 DIAGNOSIS — M51.36 OTHER INTERVERTEBRAL DISC DEGENERATION, LUMBAR REGION: ICD-10-CM

## 2023-12-26 DIAGNOSIS — M16.0 BILATERAL PRIMARY OSTEOARTHRITIS OF HIP: ICD-10-CM

## 2023-12-26 DIAGNOSIS — M17.11 UNILATERAL PRIMARY OSTEOARTHRITIS, RIGHT KNEE: ICD-10-CM

## 2023-12-26 DIAGNOSIS — M79.651 PAIN IN RIGHT THIGH: ICD-10-CM

## 2023-12-26 PROCEDURE — 96372 THER/PROPH/DIAG INJ SC/IM: CPT

## 2023-12-26 PROCEDURE — 73502 X-RAY EXAM HIP UNI 2-3 VIEWS: CPT | Mod: RT

## 2023-12-26 PROCEDURE — 93971 EXTREMITY STUDY: CPT | Mod: RT

## 2023-12-26 PROCEDURE — 70450 CT HEAD/BRAIN W/O DYE: CPT | Mod: 26,MA

## 2023-12-26 PROCEDURE — 73562 X-RAY EXAM OF KNEE 3: CPT | Mod: RT

## 2023-12-26 PROCEDURE — 99284 EMERGENCY DEPT VISIT MOD MDM: CPT | Mod: 25

## 2023-12-26 PROCEDURE — 70450 CT HEAD/BRAIN W/O DYE: CPT | Mod: MA

## 2023-12-26 PROCEDURE — 73502 X-RAY EXAM HIP UNI 2-3 VIEWS: CPT | Mod: 26,RT

## 2023-12-26 PROCEDURE — 72192 CT PELVIS W/O DYE: CPT | Mod: MA

## 2023-12-26 PROCEDURE — 72192 CT PELVIS W/O DYE: CPT | Mod: 26,MA

## 2023-12-26 PROCEDURE — 76376 3D RENDER W/INTRP POSTPROCES: CPT

## 2023-12-26 PROCEDURE — 73700 CT LOWER EXTREMITY W/O DYE: CPT | Mod: MA,RT

## 2023-12-26 PROCEDURE — 99284 EMERGENCY DEPT VISIT MOD MDM: CPT

## 2023-12-26 PROCEDURE — 76376 3D RENDER W/INTRP POSTPROCES: CPT | Mod: 26,76

## 2023-12-26 PROCEDURE — 73562 X-RAY EXAM OF KNEE 3: CPT | Mod: 26,RT

## 2023-12-26 PROCEDURE — 73700 CT LOWER EXTREMITY W/O DYE: CPT | Mod: 26,RT,MA

## 2023-12-26 PROCEDURE — 93971 EXTREMITY STUDY: CPT | Mod: 26,RT

## 2023-12-26 RX ORDER — KETOROLAC TROMETHAMINE 30 MG/ML
15 SYRINGE (ML) INJECTION ONCE
Refills: 0 | Status: DISCONTINUED | OUTPATIENT
Start: 2023-12-26 | End: 2023-12-26

## 2023-12-26 RX ADMIN — Medication 15 MILLIGRAM(S): at 13:14

## 2023-12-26 NOTE — ED ADULT NURSE NOTE - NSFALLRISKINTERV_ED_ALL_ED
Assistance OOB with selected safe patient handling equipment if applicable/Assistance with ambulation/Communicate fall risk and risk factors to all staff, patient, and family/Provide visual cue: yellow wristband, yellow gown, etc/Reinforce activity limits and safety measures with patient and family/Call bell, personal items and telephone in reach/Instruct patient to call for assistance before getting out of bed/chair/stretcher/Non-slip footwear applied when patient is off stretcher/[place] to call system/Physically safe environment - no spills, clutter or unnecessary equipment/Purposeful Proactive Rounding/Room/bathroom lighting operational, light cord in reach Assistance OOB with selected safe patient handling equipment if applicable/Assistance with ambulation/Communicate fall risk and risk factors to all staff, patient, and family/Provide visual cue: yellow wristband, yellow gown, etc/Reinforce activity limits and safety measures with patient and family/Call bell, personal items and telephone in reach/Instruct patient to call for assistance before getting out of bed/chair/stretcher/Non-slip footwear applied when patient is off stretcher/[place] to call system/Physically safe environment - no spills, clutter or unnecessary equipment/Purposeful Proactive Rounding/Room/bathroom lighting operational, light cord in reach

## 2023-12-26 NOTE — ED STATDOCS - PROGRESS NOTE DETAILS
PA: Patient is an 88 year old female with no pertinent PMHx who presents to ED c/o right leg pain. DENIES any trauma or fall injury. ~Fuentes Pearson PA-C PA note: CT scans shows arthritis of right hip/knee. All labwork/radiology results discussed in detail with patient. Patient re-examined and re-evaluated. Patient feels much better at this time. ED evaluation, Diagnosis and management discussed with the patient in detail. Workup results discussed with ED attending, [place] to dc home. Close ORTHO/PMD follow up encouraged, aftercare to assist with scheduling appointment ASAP. Strict ED return instructions discussed in detail and patient given the opportunity to ask any questions about their discharge diagnosis and instructions. Patient verbalized understanding. ~ Fuentes Pearson PA-C PA note: CT scans shows arthritis of right hip/knee. All labwork/radiology results discussed in detail with patient. Patient re-examined and re-evaluated. Patient feels much better at this time, ambulating well with a rolling walker. ED evaluation, Diagnosis and management discussed with the patient in detail. Workup results discussed with ED attending, [place] to [place] home. Close ORTHO/PMD follow up encouraged, aftercare to assist with scheduling appointment ASAP. Strict ED return instructions discussed in detail and patient given the opportunity to ask any questions about their discharge diagnosis and instructions. Patient verbalized understanding. ~ Fuentes Pearson PA-C PA note: CT scans shows arthritis of right hip/knee. All labwork/radiology results discussed in detail with patient. Patient re-examined and re-evaluated. Patient feels much better at this time, ambulating well with a rolling walker. ED evaluation, Diagnosis and management discussed with the patient in detail. Workup results discussed with ED attending, [place] to [place] home. Close ORTHO/PMD follow up encouraged, aftercare to assist with scheduling appointment ASAP. Strict ED return instructions discussed in detail and patient given the opportunity to ask any questions about their discharge diagnosis and instructions. Patient verbalized understanding. ~ Fuentes Pearson PA-C

## 2023-12-26 NOTE — ED STATDOCS - PATIENT PORTAL LINK FT
You can access the FollowMyHealth Patient Portal offered by [facility] by registering at the following website: [email or web address]. By joining [employer]’s FollowMyHealth portal, you will also be able to view your health information using other applications (apps) compatible with our system. You can access the FollowMyHealth Patient Portal offered by [facility] by registering at the following website: [email or web address]. By joining [employer]’s FollowMyHealth portal, you will also be able to view your health information using other applications (apps) compatible with our system.

## 2023-12-26 NOTE — ED STATDOCS - CLINICAL SUMMARY MEDICAL DECISION MAKING FREE TEXT BOX
Elderly female presents to the ED c/o Elderly female presents to the ED c/o several days of RLE pain, sent in by PMD to rule out DVT. Vitals normal. Limited ROM secondary to pain. Plan ultrasound, x-ray, Toradol and reassess. Elderly female presents to the ED c/o several days of RLE pain, sent in by PMD to rule out DVT. Vitals normal. Limited ROM secondary to pain. Plan ultrasound, x-ray, Toradol and reassess.    PA note: CT scans shows arthritis of right hip/knee. All labwork/radiology results discussed in detail with patient. Patient re-examined and re-evaluated. Patient feels much better at this time. ED evaluation, Diagnosis and management discussed with the patient in detail. Workup results discussed with ED attending, [place] to [place] home. Close ORTHO/PMD follow up encouraged, aftercare to assist with scheduling appointment ASAP. Strict ED return instructions discussed in detail and patient given the opportunity to ask any questions about their discharge diagnosis and instructions. Patient verbalized understanding. ~ Fuentes Pearson PA-C Elderly female presents to the ED c/o several days of RLE pain, sent in by PMD to rule out DVT. Vitals normal. Limited ROM secondary to pain. Plan ultrasound, x-ray, Toradol and reassess.    PA note: CT scans shows arthritis of right hip/knee. All labwork/radiology results discussed in detail with patient. Patient re-examined and re-evaluated. Patient feels much better at this time. ED evaluation, Diagnosis and management discussed with the patient in detail. Workup results discussed with ED attending, [place] to [place] home. Close ORTHO/PMD follow up encouraged, aftercare to assist with scheduling appointment ASAP. Strict ED return instructions discussed in detail and patient given the opportunity to ask any questions about their discharge diagnosis and instructions. Patient verbalized understanding. ~ Fuentes Pearson PA-C Elderly female presents to the ED c/o several days of RLE pain, sent in by PMD to rule out DVT. Vitals normal. Limited ROM secondary to pain. Plan ultrasound, x-ray, Toradol and reassess.    PA note: CT scans shows arthritis of right hip/knee. All labwork/radiology results discussed in detail with patient. Patient re-examined and re-evaluated. Patient feels much better at this time, ambulating well with a rolling walker. ED evaluation, Diagnosis and management discussed with the patient in detail. Workup results discussed with ED attending, OK to dc home. Close ORTHO/PMD follow up encouraged, aftercare to assist with scheduling appointment ASAP. Strict ED return instructions discussed in detail and patient given the opportunity to ask any questions about their discharge diagnosis and instructions. Patient verbalized understanding. ~ Fuentes Pearson PA-C

## 2023-12-26 NOTE — ED STATDOCS - NSFOLLOWUPINSTRUCTIONS_ED_ALL_ED_FT
Hip Pain  The hip is the joint between the upper legs and the lower pelvis. The bones, cartilage, tendons, and muscles of your hip joint support your body and allow you to move around.    Hip pain can range from a minor ache to severe pain in one or both of your hips. The pain may be felt on the inside of the hip joint near the groin, or on the outside near the buttocks and upper thigh. You may also have swelling or stiffness in your hip area.    Follow these instructions at home:  Managing pain, stiffness, and swelling    A bag of ice on a towel on the skin.  A heating pad for use on the affected area.  If told, put ice on the painful area.  Put ice in a plastic bag.  Place a towel between your skin and the bag.  Leave the ice on for 20 minutes, 2–3 times a day.  If told, apply heat to the affected area as often as told by your health care provider. Use the heat source that your provider recommends, such as a moist heat pack or a heating pad.  Place a towel between your skin and the heat source.  Leave the heat on for 20–30 minutes.  If your skin turns bright red, remove the ice or heat right away to prevent skin damage. The risk of damage is higher if you cannot feel pain, heat, or cold.  Activity    Do exercises as told by your provider.  Avoid activities that cause pain.  General instructions    A person writing in a journal.  Take over-the-counter and prescription medicines only as told by your provider.  Keep a journal of your symptoms. Write down:  How often you have hip pain.  The location of your pain.  What the pain feels like.  What makes the pain worse.  Sleep with a pillow between your legs on your most comfortable side.  Keep all follow-up visits. Your provider will monitor your pain and activity.  Contact a health care provider if:  You cannot put weight on your leg.  Your pain or swelling gets worse after a week.  It gets harder to walk.  You have a fever.  Get help right away if:  You fall.  You have a sudden increase in pain and swelling in your hip.  Your hip is red or swollen or very tender to touch.  This information is not intended to replace advice given to you by your health care provider. Make sure you discuss any questions you have with your health care provider.      Knee Pain    AMBULATORY CARE:    Knee pain may start suddenly, or it may be a long-term problem. You may have pain on the side, front, or back of your knee. You may have knee stiffness and swelling. You may hear popping sounds or feel like your knee is giving way or locking up as you walk. You may feel pain when you sit, stand, walk, or climb up and down stairs. Knee pain can be caused by conditions such as obesity, inflammation, or strains or tears in ligaments or tendons.    Seek care immediately if:    Your pain is worse, even after treatment.    You cannot bend or straighten your leg completely.    The swelling around your knee does not go down even with treatment.    Your knee is painful and hot to the touch.  Contact your healthcare provider if:    You have questions or concerns about your condition or care.    Treatment will depend on the cause of your pain. You may need any of the following:    NSAIDs help decrease swelling and pain or fever. This medicine is available with or without a doctor's order. NSAIDs can cause stomach bleeding or kidney problems in certain people. If you take blood thinner medicine, always ask your healthcare provider if NSAIDs are safe for you. Always read the medicine label and follow directions.    Acetaminophen decreases pain and fever. It is available without a doctor's order. Ask how much to take and how often to take it. Follow directions. Read the labels of all other medicines you are using to see if they also contain acetaminophen, or ask your doctor or pharmacist. Acetaminophen can cause liver damage if not taken correctly.    Prescription pain medicine may be given. Ask your healthcare provider how to take this medicine safely. Some prescription pain medicines contain acetaminophen. Do not take other medicines that contain acetaminophen without talking to your healthcare provider. Too much acetaminophen may cause liver damage. Prescription pain medicine may cause constipation. Ask your healthcare provider how to prevent or treat constipation.    Steroid injections may be given into your knee. Steroids reduce inflammation and pain.    Surgery may be used for some injuries, such as to repair a torn ACL.  What you can do to manage your symptoms:    Rest your knee so it can heal. Limit activities that increase your pain. Do low-impact exercises, such as walking or swimming.    Apply ice to help reduce swelling and pain. Use an ice pack, or put crushed ice in a plastic bag. Cover it with a towel before you apply it to your knee. Apply ice for 15 to 20 minutes every hour, or as directed.    Apply compression to help reduce swelling. Use a brace or bandage only as directed.    Elevate your knee to help decrease pain and swelling. Elevate your knee while you are sitting or lying down. Prop your leg on pillows to keep your knee above the level of your heart.    Prevent your knee from moving as directed. Your healthcare provider may put on a cast or splint. You may need to wear a leg brace to stabilize your knee. A leg brace can be adjusted to increase your range of motion as your knee heals.  Hinged Knee Braces   What you can do to prevent knee pain:    Maintain a healthy weight. Extra weight increases your risk for knee pain. Ask your healthcare provider how much you should weigh. He or she can help you create a safe weight loss plan if you need to lose weight.    Exercise or train properly. Use the correct equipment for sports. Wear shoes that provide good support. Check your posture often as you exercise, play sports, or train for an event. This can help prevent stress and strain on your knees. Rest between sessions so you do not overwork your knees.  Follow up with your healthcare provider within 24 hours or as directed: Write down your questions so you remember to ask them during your visits.

## 2023-12-26 NOTE — ED STATDOCS - CARE PROVIDERS DIRECT ADDRESSES
[email or web address]

## 2023-12-26 NOTE — ED ADULT TRIAGE NOTE - CHIEF COMPLAINT QUOTE
pt presents to ED from home for swelling, pain to R leg. denies fall or known injury. + SOB. states pain x few days. - blood thinners.

## 2023-12-26 NOTE — ED STATDOCS - MUSCULOSKELETAL, MLM
range of motion is not limited and there is no muscle tenderness. Unable to range knee and hip due to pain. RLE NVI. No obvious deformity, no pinpoint TTP in RLE. No hip TTP.

## 2023-12-26 NOTE — ED STATDOCS - ATTENDING APP SHARED VISIT CONTRIBUTION OF CARE
I, Sonu Burdick, DO personally saw the patient with ISMAEL.  I have personally performed a face to face diagnostic evaluation on this patient.  I have reviewed the ISMAEL note and agree with the history, exam, and plan of care, except as noted.  I personally saw the patient and performed a substantive portion of the visit including all aspects of the medical decision making.

## 2023-12-26 NOTE — ED STATDOCS - OBJECTIVE STATEMENT
88 year old female with no pertinent PMHx; presents to the ED sent in by Dr. Bills to rule out DVT; patient c/o right LE pain, mostly localized to the front of thigh. Pain worsens with movement. Took Tylenol last night with no relief. Endorses difficulty moving right LE and foot. Denies hip pain, any trauma or injury.   -blood thinners.

## 2023-12-26 NOTE — ED STATDOCS - CARE PROVIDER_API CALL
Ron Syed  Orthopaedic Surgery  [address] [place] [id number]  Phone: [phone]  Fax: [phone]  Follow Up Time: Urgent   Ron Syed  Orthopaedic Surgery  [address] [place] [id number]  Phone: [phone]  Fax: [phone]  Follow Up Time: Urgent

## 2023-12-26 NOTE — ED STATDOCS - PHYSICAL EXAMINATION
PA NOTE: GEN: AOX3, NAD. HEENT: Throat clear. Airway is patent. EYES: PERRLA. EOMI. Head: NC/AT. NECK: Supple, No JVD. FROM. C-spine non-tender. CV:S1S2, RRR, LUNGS: Non-labored breathing, no tachypnea. O2sat 100% RA. CTA b/l. No w/r/r. CHEST: Equal chest expansion and rise. No deformity. ABD: Soft, NT/ND, no rebound, no guarding. No CVAT. EXT: No e/c/c. 2+ distal pulses. RLE: +Mild tenderness right hip and right knee area. Painful ROM. NVI. SKIN: No rashes. NEURO: No focal deficits. CN II-XII intact. FROM. 5/5 motor and sensory. ~Fuentes Pearson PA-C

## 2023-12-26 NOTE — ED STATDOCS - PROVIDER TOKENS
PROVIDER:[TOKEN:[[id number]],FOLLOWUP:[Urgent]] PROVIDER:[TOKEN:[[id number]],FOLLOWUP:[Urgent]]

## 2023-12-26 NOTE — ED ADULT NURSE NOTE - OBJECTIVE STATEMENT
pt c/o swelling, pain to R leg. denies fall or known injury. + SOB. states pain x few days. - blood thinners.

## 2023-12-27 ENCOUNTER — NON-APPOINTMENT (OUTPATIENT)
Age: 88
End: 2023-12-27

## 2023-12-29 ENCOUNTER — APPOINTMENT (OUTPATIENT)
Dept: ORTHOPEDIC SURGERY | Facility: CLINIC | Age: 88
End: 2023-12-29
Payer: MEDICARE

## 2023-12-29 VITALS
BODY MASS INDEX: 31.16 KG/M2 | HEART RATE: 108 BPM | SYSTOLIC BLOOD PRESSURE: 127 MMHG | WEIGHT: 187 LBS | DIASTOLIC BLOOD PRESSURE: 78 MMHG | HEIGHT: 65 IN

## 2023-12-29 DIAGNOSIS — M54.16 RADICULOPATHY, LUMBAR REGION: ICD-10-CM

## 2023-12-29 PROCEDURE — 99204 OFFICE O/P NEW MOD 45 MIN: CPT

## 2023-12-29 NOTE — PHYSICAL EXAM
[de-identified] : Lumbar spine Palpation: Tender to palpation lateral hip over greater troch Motor: 4-/5 L2-3 on right, 5/5 L4-S1 Sensory: 2/2 L2-S1 Straight leg raise: Pain on right Clonus: < 5 beats [de-identified] : 12/29/23: Outside imaging reviewed-joint space well-preserved, degeneration throughout lumbar spine partially visualized

## 2023-12-29 NOTE — DISCUSSION/SUMMARY
[de-identified] : Lumbar radiculopathy  Extensive discussion of the natural history of this issue was had with the patient.  We discussed the treatment options focusing on conservative therapy which includes anti-inflammatories, physical therapy/home exercise, & activity modification.  -A prescription for a Medrol Dosepak with the appropriate warnings and side effects was given to the patient. -Home physical therapy prescription was given to the patient as patient is homebound. We discussed red flag symptoms and that the patient should immediately report to the emergency department if these occur.  Patient will follow-up in 1 to 2 months if the pain returns or does not improve.

## 2023-12-29 NOTE — HISTORY OF PRESENT ILLNESS
[de-identified] : 12/29/23: Patient here for 1 week of right hip pain.  States she feels very weak and cannot lift her leg.  Pain is in the groin and on the lateral aspect of her hip.  Ambulating with a walker.  Using Tylenol for pain.  Denies allergies anticoagulation, PMH-diabetes-last A1c 5.5.

## 2024-06-12 PROBLEM — E11.9 TYPE 2 DIABETES MELLITUS WITHOUT COMPLICATIONS: Chronic | Status: ACTIVE | Noted: 2023-12-26

## 2024-06-27 ENCOUNTER — APPOINTMENT (OUTPATIENT)
Dept: HEMATOLOGY ONCOLOGY | Facility: CLINIC | Age: 89
End: 2024-06-27
Payer: MEDICARE

## 2024-06-27 VITALS
WEIGHT: 194 LBS | TEMPERATURE: 98.2 F | OXYGEN SATURATION: 95 % | HEIGHT: 65 IN | BODY MASS INDEX: 32.32 KG/M2 | DIASTOLIC BLOOD PRESSURE: 63 MMHG | HEART RATE: 83 BPM | SYSTOLIC BLOOD PRESSURE: 120 MMHG

## 2024-06-27 DIAGNOSIS — H35.30 UNSPECIFIED MACULAR DEGENERATION: ICD-10-CM

## 2024-06-27 DIAGNOSIS — Z60.2 PROBLEMS RELATED TO LIVING ALONE: ICD-10-CM

## 2024-06-27 DIAGNOSIS — E83.110 HEREDITARY HEMOCHROMATOSIS: ICD-10-CM

## 2024-06-27 PROCEDURE — G2211 COMPLEX E/M VISIT ADD ON: CPT

## 2024-06-27 PROCEDURE — 99204 OFFICE O/P NEW MOD 45 MIN: CPT

## 2024-06-27 SDOH — SOCIAL STABILITY - SOCIAL INSECURITY: PROBLEMS RELATED TO LIVING ALONE: Z60.2

## 2024-06-27 NOTE — ASSESSMENT
[FreeTextEntry1] : Patient is an 89 y.o. 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  She underwent a series of phlebotomies to get her Ferritin level down to ~ 100.  Last phlebotomy was 2/3/21.  Has not been seen here since.  Labs by PCP now with Ferritin 206 (), TSAT 46% (ULN 45%).  Discussed with patient and daughter.  Points -  Over last 3 years patient's Ferritin level has only increased by ~ 100 points, and in now only a few points over ULN.  She does report she has her Ferritin checked ~ Q3 months, so presumably this is the 1st time it has been elevated.  Discussion - There is no need to restart phlebotomies at this time.  Phlebotomies can be started when there is indication of iron overload and at this time there is no indication.  Her iron studies have only been minimally elevated x 1 - can also be elevated due to fatty liver, inflammation, etc. Does not necessarily correlate to iron overload. There is not really a survival benefit is starting phlebotomies now either as the real benefit is in preventing cirrhosis and she does not have any evidence of cirrhosis at this time.  Patient also prefers to be less aggressive at this point in her life.   Can easily wait until Ferritin in 4 - 500 range, then we can reassess if it is worth doing phlebotomies.  Reviewed with patient and daughter and they are in agreement.  She can continue to have labs monitored by PCP - would also recommend checking an inflammatory marker such as CRP or ESR to help interpret Ferritin. If the ESR, CRP is elevated then the Ferritin is falsely elevated since it is an acute phase reactant. (There is no "formula" to determine how to adjust the Ferritin level). We will be happy to see the patient again should her Ferritin level be >400, however I really would be surprised if that should happen and be related to iron overload.   Dr. Geovany Bills (PCP) Dr. Torsten Mendoza (Cardiologist) Dr. Lucía Madsen (Endo)

## 2024-06-27 NOTE — HISTORY OF PRESENT ILLNESS
[de-identified] : RICHI WHITE is an 88 y.o. F with a PMH significant for DM2, a hx of a left sided ER+, HER2- stage I BC in 2007, and Compound Heterozygous Hemochromatosis, who has been referred back to our office to be evaluated and for a phlebotomy.   10/2017 - Routine blood work by her PCP showed a Hgb 14.3, Ferritin: 537 and TSAT: 53%. She stopped multivitamin with iron. 11/2017 - Repeat iron studies Ferritin: 476, TSAT: 48%.   Ferritin: 509, TSAT: 55%  12/22/17 - HFE testing - Two mutations (C282Y and H63D) identified. Started on phlebotomies, initially every 2 months. 2/20/19 - Ferritin was 109, TSAT 41%. - 1st time close to 100 range.  Last phleb was 2/3/21.  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 Had XRT, followed by 10 years of hormonal therapy completed on 2/2018 (initially with Femara, then changed to Arimidex due to cost issues).  5/28/24 - Labs by PCP - WBC: 6.2, Hgb: 13.7, Hct: 41.9, MCV: 97, Plts: 220, Ferritin: 206, TSAT: 46%.  LFT's WNL Was sent back to our office to see if she needs to restart of phlebotomies.   Patient reports no new medical issues since she was here last.

## 2024-06-27 NOTE — REVIEW OF SYSTEMS
[Patient Intake Form Reviewed] : Patient intake form was reviewed [Shortness Of Breath] : shortness of breath [Diarrhea: Grade 0] : Diarrhea: Grade 0 [Joint Pain] : joint pain [Joint Stiffness] : joint stiffness [Difficulty Walking] : difficulty walking [Negative] : Allergic/Immunologic [FreeTextEntry9] : chronic back and leg pains

## 2024-06-27 NOTE — PHYSICAL EXAM
[Obese] : obese [Normal] : affect appropriate [de-identified] : anicteric [de-identified] : using walker [de-identified] : no rashes

## 2024-06-27 NOTE — REASON FOR VISIT
[Initial Consultation] : an initial consultation for [Other: _____] : [unfilled] [FreeTextEntry2] : Compound Heterozygous Hemochromatosis; evaluation for a phlebotomy

## 2025-05-06 NOTE — ED STATDOCS - SCRIBE NAME
What Type Of Note Output Would You Prefer (Optional)?: Bullet Format Hpi Title: Evaluation of Skin Lesions How Severe Are Your Spot(S)?: mild Have Your Spot(S) Been Treated In The Past?: has not been treated Additional History: Patient states there are 2 spots on the nose that are not healing. need for outpatient follow-up/return to ED if symptoms worsen, persist or questions arise Lavinia Dorman

## 2025-07-25 ENCOUNTER — EMERGENCY (EMERGENCY)
Facility: HOSPITAL | Age: 89
LOS: 0 days | Discharge: HOME | End: 2025-07-25
Attending: STUDENT IN AN ORGANIZED HEALTH CARE EDUCATION/TRAINING PROGRAM
Payer: MEDICARE

## 2025-07-25 VITALS
RESPIRATION RATE: 16 BRPM | SYSTOLIC BLOOD PRESSURE: 148 MMHG | HEART RATE: 98 BPM | OXYGEN SATURATION: 97 % | DIASTOLIC BLOOD PRESSURE: 73 MMHG | TEMPERATURE: 98 F

## 2025-07-25 VITALS
TEMPERATURE: 98 F | HEART RATE: 99 BPM | SYSTOLIC BLOOD PRESSURE: 136 MMHG | OXYGEN SATURATION: 97 % | DIASTOLIC BLOOD PRESSURE: 63 MMHG | RESPIRATION RATE: 17 BRPM

## 2025-07-25 DIAGNOSIS — S80.01XA CONTUSION OF RIGHT KNEE, INITIAL ENCOUNTER: ICD-10-CM

## 2025-07-25 DIAGNOSIS — M25.511 PAIN IN RIGHT SHOULDER: ICD-10-CM

## 2025-07-25 DIAGNOSIS — M25.561 PAIN IN RIGHT KNEE: ICD-10-CM

## 2025-07-25 DIAGNOSIS — Z91.040 LATEX ALLERGY STATUS: ICD-10-CM

## 2025-07-25 DIAGNOSIS — S00.81XA ABRASION OF OTHER PART OF HEAD, INITIAL ENCOUNTER: ICD-10-CM

## 2025-07-25 DIAGNOSIS — S00.31XA ABRASION OF NOSE, INITIAL ENCOUNTER: ICD-10-CM
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
## 2025-07-25 PROCEDURE — 73700 CT LOWER EXTREMITY W/O DYE: CPT | Mod: 26,RT

## 2025-07-25 PROCEDURE — 73030 X-RAY EXAM OF SHOULDER: CPT | Mod: RT

## 2025-07-25 PROCEDURE — 76376 3D RENDER W/INTRP POSTPROCES: CPT

## 2025-07-25 PROCEDURE — 70450 CT HEAD/BRAIN W/O DYE: CPT

## 2025-07-25 PROCEDURE — 72125 CT NECK SPINE W/O DYE: CPT | Mod: 26

## 2025-07-25 PROCEDURE — 76376 3D RENDER W/INTRP POSTPROCES: CPT | Mod: 26

## 2025-07-25 PROCEDURE — 73700 CT LOWER EXTREMITY W/O DYE: CPT | Mod: RT

## 2025-07-25 PROCEDURE — 99284 EMERGENCY DEPT VISIT MOD MDM: CPT | Mod: 25

## 2025-07-25 PROCEDURE — 99284 EMERGENCY DEPT VISIT MOD MDM: CPT

## 2025-07-25 PROCEDURE — 72125 CT NECK SPINE W/O DYE: CPT

## 2025-07-25 PROCEDURE — 73030 X-RAY EXAM OF SHOULDER: CPT | Mod: 26,RT

## 2025-07-25 PROCEDURE — 70450 CT HEAD/BRAIN W/O DYE: CPT | Mod: 26

## 2025-07-25 NOTE — ED PROVIDER NOTE - NSFOLLOWUPINSTRUCTIONS_ED_ALL_ED_FT
Return to the Emergency Department for worsening or persistent symptoms, and/or ANY NEW OR CONCERNING SYMPTOMS. If you have issues obtaining follow up, please call: [phone] or [phone]  to obtain a doctor or specialist who takes your insurance in your area.    keep sling in place  tylenol 975mg every 4-6 hours for pain  if no improvement follow up with ortho  official xray reads to be available tomorrow

## 2025-07-25 NOTE — ED PROVIDER NOTE - OBJECTIVE STATEMENT
Patient is a 89 year old female who presents to the ED for evaluation after a fall. Patient states she was using her walker at home, when she tripped and fell hitting her forehead. without loss of consciousness or blood thinner use. She reports right knee and right shoulder pain after the fall. She denies any dizziness. headache, nausea, vomiting, chest pain, shortness of breath.

## 2025-07-25 NOTE — ED ADULT TRIAGE NOTE - CHIEF COMPLAINT QUOTE
pt bibems s/p fall at home. uses a walker at home, states she tripped landing on her face. abrasion to forehead and nose. c/o pain at forehead, right knee and right shoulder. right knee swelling and ecchymosis. denies dizziness, LOC, or bloodthinners. NA called at 0739, brought to CT. placed in view of nursing station.

## 2025-07-25 NOTE — ED PROVIDER NOTE - CARE PROVIDER_API CALL
Jon Pablo)  Orthopaedic Surgery  [address] [place] [id number]  Phone: [phone]  Fax: [phone]  Follow Up Time:

## 2025-07-25 NOTE — ED ADULT NURSE NOTE - OBJECTIVE STATEMENT
Pt is an 89 yo female who presents to the ED for a fall. Pt AOX4 and ambulatory with a walker at baseline. Pt endorses slip and fall, (+) HS, (-) LOC, (-) blood thinners. Pt fell onto rug and has an abrasion to the left forehead. Pt endorses pain to the right shoulder and right knee, stating the pain is only exacerbated with movement. Pt denies chest pain, dizziness, sob. Pt placed in view of the nurses station. Pending CT results.

## 2025-07-25 NOTE — ED PROVIDER NOTE - PATIENT PORTAL LINK FT
You can access the FollowMyHealth Patient Portal offered by [facility] by registering at the following website: [email or web address]. By joining [facility]’s FollowMyHealth portal, you will also be able to view your health information using other applications (apps) compatible with our system.

## 2025-07-25 NOTE — ED PROVIDER NOTE - PROGRESS NOTE DETAILS
imaging reviewed  CTH/cspine without any acute injury    Xrays reviewed.  no acute fx/dislocation    sling applied to RUE. Patient and daughter at bedside instructed to follow up with orthopedic surgery outpatient.     on re-eval, patient resting comfortably. ambulating at a steady gait. vitals stable.     patient and daughter are comfortable with discharge home at this time.

## 2025-07-25 NOTE — ED PROVIDER NOTE - CLINICAL SUMMARY MEDICAL DECISION MAKING FREE TEXT BOX
89F who presents s/p fall with walker. + head strike, no LOC or AC. Will obtain imaging CTH/Cspine, as well as xrays of RUE and R knee. Pain control. Sling for right shoulder pain. If no acute injuries and patient able to ambulate, anticipate discharge home.

## 2025-07-25 NOTE — ED ADULT TRIAGE NOTE - TEMP AT ED ARRIVAL (C)
Recommended supportive care:  - Increase fluid intake  - Encouraged adequate rest  - Recommended OTC claritin or zyrtec and flonase  - Take OTC motrin/tylenol for fevers/body aches. Motrin given in office for fever.  - Stay home until at least 24 hours fever free without medications. Gave school excuse for today and tomorrow. Explained to mother if he misses another day after that, call and we will get patient a new excuse.  - The patient is to follow up with PCP or return to clinic if symptoms worsen/fail to improve.  
36.4

## 2025-07-25 NOTE — ED PROVIDER NOTE - PHYSICAL EXAMINATION
Constitutional: NAD AAOx3  Eyes: EOMI, pupils equal  Head: Normocephalic atraumatic, abrasions to forehead/nose  Mouth: no airway obstruction  Cardiac: regular rate   Resp: Lungs CTAB  GI: Abd s/nt/nd  Neuro: CN2-12 intact  Skin: No rashes  extremities: right knee ecchymosis and swelling, right shoulder tenderness, with limited ROM secondary to pain, full ROM to LUE and LLE.